# Patient Record
Sex: MALE | Race: WHITE
[De-identification: names, ages, dates, MRNs, and addresses within clinical notes are randomized per-mention and may not be internally consistent; named-entity substitution may affect disease eponyms.]

---

## 2019-07-07 ENCOUNTER — HOSPITAL ENCOUNTER (EMERGENCY)
Dept: HOSPITAL 26 - MED | Age: 39
Discharge: HOME | End: 2019-07-07
Payer: MEDICAID

## 2019-07-07 VITALS — HEIGHT: 68 IN | BODY MASS INDEX: 25.46 KG/M2 | WEIGHT: 168 LBS

## 2019-07-07 VITALS — SYSTOLIC BLOOD PRESSURE: 135 MMHG | DIASTOLIC BLOOD PRESSURE: 101 MMHG

## 2019-07-07 VITALS — SYSTOLIC BLOOD PRESSURE: 135 MMHG | DIASTOLIC BLOOD PRESSURE: 92 MMHG

## 2019-07-07 DIAGNOSIS — G51.0: Primary | ICD-10-CM

## 2019-07-07 PROCEDURE — 99283 EMERGENCY DEPT VISIT LOW MDM: CPT

## 2019-07-07 NOTE — NUR
Patient discharged with v/s stable. Written and verbal after care instructions 
given and explained. 

Patient alert, oriented and verbalized understanding of instructions. 
Ambulatory with steady gait. All questions addressed prior to discharge. ID 
band removed. Patient advised to follow up with PMD. Rx of PREDNISONE/ACYCLOVIR 
given. Patient educated on indication of medication including possible reaction 
and side effects. Opportunity to ask questions provided and answered.

## 2019-07-07 NOTE — NUR
C/O RIGHT SIDED FACIAL PARESTHESIA WITH RIGHT EAR PAIN 

SUBTLE RIGHT PTOSIS AND DROOP---FULL CLEAR SPEECH;  PT STATED HE STARTED TO 
TAKE ACYCLOVIR FROM PREVIOUS SYMPTOMS OF BELL'S PALSY



AMBULATORY WITH STEADY GAIT, EQUAL , ---DENIES INJURY/TRAUMA

## 2019-07-30 ENCOUNTER — HOSPITAL ENCOUNTER (EMERGENCY)
Dept: HOSPITAL 26 - MED | Age: 39
Discharge: HOME | End: 2019-07-30
Payer: MEDICAID

## 2019-07-30 VITALS — DIASTOLIC BLOOD PRESSURE: 90 MMHG | SYSTOLIC BLOOD PRESSURE: 146 MMHG

## 2019-07-30 VITALS — BODY MASS INDEX: 24.33 KG/M2 | HEIGHT: 67 IN | WEIGHT: 155 LBS
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
## 2019-07-30 DIAGNOSIS — T78.40XA: Primary | ICD-10-CM

## 2019-07-30 PROCEDURE — 96372 THER/PROPH/DIAG INJ SC/IM: CPT

## 2019-07-30 PROCEDURE — 99283 EMERGENCY DEPT VISIT LOW MDM: CPT

## 2019-07-30 NOTE — NUR
C/O RASH TO CHEST & BACK X2 WEEKS. PT STATES HE WAS GIVEN PREDNISONE & 
ACYCLOVIR ON JULY 7TH WHEN HE WAS SEEN AT [facility] AND AFTER TAKING IT FOR A FEW 
DAYS HE DEVELOPED A RASH. PT DENIES ANY OTHER COMPLAINTS AT THIS TIME. DIFFUSE 
RED, BUMPY RASH NOTED TO UPPER CHEST AND ENTIRE BACK. PT PLACED IN GOWN, BED IN 
LOW POSITION, SIDE RAIL UP X1

## 2019-07-30 NOTE — NUR
Patient discharged with v/s stable. Written and verbal after care instructions 
given and explained. 

Patient alert, oriented and verbalized understanding of instructions. 
Ambulatory with steady gait. All questions addressed prior to discharge. ID 
band removed. Patient advised to follow up with PMD. Rx of prednisone, 
hydrocortisone, and benadryl given. Patient educated on indication of 
medication including possible reaction and side effects. Opportunity to ask 
questions provided and answered.

## 2019-12-16 ENCOUNTER — HOSPITAL ENCOUNTER (EMERGENCY)
Dept: HOSPITAL 26 - MED | Age: 39
Discharge: HOME | End: 2019-12-16
Payer: MEDICAID

## 2019-12-16 VITALS — DIASTOLIC BLOOD PRESSURE: 100 MMHG | SYSTOLIC BLOOD PRESSURE: 145 MMHG

## 2019-12-16 VITALS — WEIGHT: 165 LBS | HEIGHT: 67 IN | BODY MASS INDEX: 25.9 KG/M2

## 2019-12-16 VITALS — SYSTOLIC BLOOD PRESSURE: 138 MMHG | DIASTOLIC BLOOD PRESSURE: 78 MMHG

## 2019-12-16 DIAGNOSIS — H10.213: Primary | ICD-10-CM

## 2019-12-16 NOTE — NUR
PT REASSESSED WITH ALIX LENSES IN PLACE, NS FLUID CONTINUING TO RUN, PT 
TOLERATING WITHOUT DIFFICULTIES. WIFE AT BEDSIDE.

## 2019-12-16 NOTE — NUR
PT REASSESSED WITH ALIX LENSES, IRRIGATION RUNNING WITHOUT DIFFICULTY. TOWELS 
CHANGED, POSITIONED TO KEEP WATER FROM ENTERING EARS. PT TOLERATING WELL. WIFE 
AT [facility].

## 2019-12-16 NOTE — NUR
Patient discharged with v/s stable. Written and verbal after care instructions 
given and explained. 

Patient alert, oriented and verbalized understanding of instructions. 
Ambulatory with steady gait. All questions addressed prior to discharge. ID 
band removed. Patient advised to follow up with PMD. Rx of KETOROLAC 
tROMETHAMINE given. Patient educated on indication of medication including 
possible reaction and side effects. Opportunity to ask questions provided and 
answered.

## 2019-12-16 NOTE — NUR
38 Y/O M C/O BILATERAL EYE PAIN X5 WITH REDNESS. PT STATED HE WAS WORKING WITH 
STRONG CHEMICALS YESTERDAY, AWOKE TODAY WITH ITCHING, RED EYES. THERE IS NO 
DISCHARGE, PT STATES NO BLURRY VISSION. PT POSITIONED IN BED FOR COMFORT, BED 
LOWERED, X1 SIDE RAIL IN PLACE



NKA

## 2019-12-16 NOTE — NUR
PT POSITIONED SUPINE WITH ALIX LENSES IN PLACE, BILATERAL EYES. 500 ML NS 
RUNNING ON EACH EYE. PT TOLERATING PROCEDURE WELL.

## 2019-12-16 NOTE — NUR
ALIX LENSES REMOVED WITHOUT DIFFICULTY, 500ML NS WAS USED ON EACH EYE FOR 
IRRIGATION. PATIENT TOLERATED TREATMENT WELL. POSITIONED IN BED FOR COMFORT, 
LOWERED TO LOWEST POSITION, SIDE RAIL X1 IN PLACE. WIFE AT BEDSIDE.

## 2021-05-13 ENCOUNTER — HOSPITAL ENCOUNTER (EMERGENCY)
Dept: HOSPITAL 26 - MED | Age: 41
Discharge: HOME | End: 2021-05-13
Payer: MEDICAID

## 2021-05-13 VITALS — WEIGHT: 163 LBS | BODY MASS INDEX: 24.14 KG/M2 | HEIGHT: 69 IN

## 2021-05-13 VITALS — SYSTOLIC BLOOD PRESSURE: 151 MMHG | DIASTOLIC BLOOD PRESSURE: 87 MMHG

## 2021-05-13 VITALS — DIASTOLIC BLOOD PRESSURE: 87 MMHG | SYSTOLIC BLOOD PRESSURE: 151 MMHG

## 2021-05-13 DIAGNOSIS — R42: Primary | ICD-10-CM
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
## 2021-05-13 DIAGNOSIS — T50.B95A: ICD-10-CM

## 2021-05-13 NOTE — NUR
42 Y/O MALE C/O BILATERAL LOWER LEG PAIN S/P RECEIVING 2ND MODERNA VACCINE 
YESTERDAY. PATIENT STATES THAT PAIN IS AT A 8/10, DENIES ANY RECENT ACCIDENT OR 
INJURY. NO REDNESS/SWELLING OR WARMTH NOTED AT THIS TIME. SKIN INTACT. 

NO PMH

NKDA

## 2021-05-13 NOTE — NUR
Patient discharged with v/s stable. Written and verbal after care instructions 
given and explained. 

Patient alert, oriented and verbalized understanding of instructions. 
Ambulatory with steady gait. All questions addressed prior to discharge. ID 
band removed. Patient advised to follow up with PMD. Rx of tylenol given. 
Patient educated on indication of medication including possible reaction and 
side effects. Opportunity to ask questions provided and answered.